# Patient Record
Sex: MALE | Race: BLACK OR AFRICAN AMERICAN | NOT HISPANIC OR LATINO | Employment: FULL TIME | URBAN - METROPOLITAN AREA
[De-identification: names, ages, dates, MRNs, and addresses within clinical notes are randomized per-mention and may not be internally consistent; named-entity substitution may affect disease eponyms.]

---

## 2020-02-05 ENCOUNTER — HOSPITAL ENCOUNTER (EMERGENCY)
Facility: HOSPITAL | Age: 30
Discharge: HOME | End: 2020-02-05
Attending: EMERGENCY MEDICINE
Payer: COMMERCIAL

## 2020-02-05 VITALS
OXYGEN SATURATION: 96 % | DIASTOLIC BLOOD PRESSURE: 69 MMHG | RESPIRATION RATE: 16 BRPM | HEART RATE: 75 BPM | HEIGHT: 72 IN | BODY MASS INDEX: 25.06 KG/M2 | SYSTOLIC BLOOD PRESSURE: 137 MMHG | WEIGHT: 185 LBS | TEMPERATURE: 99 F

## 2020-02-05 DIAGNOSIS — M54.41 ACUTE BILATERAL LOW BACK PAIN WITH RIGHT-SIDED SCIATICA: Primary | ICD-10-CM

## 2020-02-05 DIAGNOSIS — S39.012A STRAIN OF LUMBAR REGION, INITIAL ENCOUNTER: ICD-10-CM

## 2020-02-05 PROCEDURE — 63600175 PHARM REV CODE 636 W HCPCS: Performed by: NURSE PRACTITIONER

## 2020-02-05 PROCEDURE — 99284 EMERGENCY DEPT VISIT MOD MDM: CPT | Mod: 25

## 2020-02-05 PROCEDURE — 96372 THER/PROPH/DIAG INJ SC/IM: CPT | Mod: 59

## 2020-02-05 RX ORDER — NAPROXEN 500 MG/1
500 TABLET ORAL EVERY 12 HOURS PRN
Qty: 20 TABLET | Refills: 0 | Status: SHIPPED | OUTPATIENT
Start: 2020-02-05

## 2020-02-05 RX ORDER — TIZANIDINE 2 MG/1
2 TABLET ORAL EVERY 8 HOURS PRN
Qty: 15 TABLET | Refills: 0 | Status: SHIPPED | OUTPATIENT
Start: 2020-02-05

## 2020-02-05 RX ORDER — ORPHENADRINE CITRATE 30 MG/ML
60 INJECTION INTRAMUSCULAR; INTRAVENOUS
Status: COMPLETED | OUTPATIENT
Start: 2020-02-05 | End: 2020-02-05

## 2020-02-05 RX ORDER — KETOROLAC TROMETHAMINE 30 MG/ML
30 INJECTION, SOLUTION INTRAMUSCULAR; INTRAVENOUS
Status: COMPLETED | OUTPATIENT
Start: 2020-02-05 | End: 2020-02-05

## 2020-02-05 RX ADMIN — ORPHENADRINE CITRATE 60 MG: 30 INJECTION INTRAMUSCULAR; INTRAVENOUS at 04:02

## 2020-02-05 RX ADMIN — KETOROLAC TROMETHAMINE 30 MG: 30 INJECTION, SOLUTION INTRAMUSCULAR at 04:02

## 2020-02-05 NOTE — ED TRIAGE NOTES
Pt. Reports pain to his lower back that radiates to his right leg. ( pt reports pain stops around his posterior ankle ). Pt. Report he as been having pain for the past week and a half. Is able to ambulate without any assistance. Pt. Reports numbness to his right hip.

## 2020-02-05 NOTE — DISCHARGE INSTRUCTIONS
Take medications as needed only as prescribed.  Do not drive or drink alcohol when taking muscle relaxers because they will make you drowsy.    Follow-up with your regular doctor or the one listed on your discharge paperwork if symptoms have not improved in 1 week.    Return to the emergency department for any new or worsening symptoms.    Thank you for coming to our Emergency Department today. It is important to remember that some problems are difficult to diagnose and may not be found during your first visit. Be sure to follow up with your primary care doctor.  If you do not have one, you may contact the one listed on your discharge paperwork or you may also call the [facility] Appointment Desk at [phone] to schedule an appointment with one.     Return to the ER with any questions/concerns, new/concerning symptoms, worsening or failure to improve. Do not drive or make any important decisions for 24 hours if you have received any pain medications, sedatives or mood altering drugs during your ER visit.

## 2020-02-05 NOTE — ED PROVIDER NOTES
Encounter Date: 2/5/2020    SCRIBE #1 NOTE: I, Scotty Flood, am scribing for, and in the presence of,  Oleg Alvarez NP. I have scribed the following portions of the note - Other sections scribed: KALEY SOLIZ.       History     Chief Complaint   Patient presents with    Back Pain     lower back pain radiating down right leg x1.5 weeks.      Leo Harrison is a 29 y.o. male who presents to the ED for evaluation gradual onset, diffuse low back pain for 2 months with radiation down the RLE to the foot for 2 weeks. Patient describes the pain as aching that is exacerbated with lumbar flexion and with right hip flexion. He states taking ibuprofen at home with mild pain relief. He states already having 1600 mg ibuprofen today, but continues to have a fair amount of pain. Patient reports heavy lifting for work and also plays basketball regularly. Patient denies any recent trauma or fall.    The history is provided by the patient.     Review of patient's allergies indicates:   Allergen Reactions    Shellfish containing products Anaphylaxis     History reviewed. No pertinent past medical history.  History reviewed. No pertinent surgical history.  History reviewed. No pertinent family history.  Social History     Tobacco Use    Smoking status: Current Some Day Smoker     Packs/day: 0.50     Types: Cigarettes    Smokeless tobacco: Never Used   Substance Use Topics    Alcohol use: Yes     Frequency: Never    Drug use: Never     Review of Systems   Constitutional: Negative for fever.   HENT: Negative for sore throat.    Respiratory: Negative for shortness of breath.    Cardiovascular: Negative for chest pain.   Gastrointestinal: Negative for nausea.   Genitourinary: Negative for dysuria.   Musculoskeletal: Positive for back pain and myalgias (RLE pain).   Skin: Negative for rash.   Neurological: Negative for weakness.   Hematological: Does not bruise/bleed easily.       Physical Exam     Initial Vitals [02/05/20 1606]   BP  Pulse Resp Temp SpO2   137/69 75 16 98.5 °F (36.9 °C) 96 %      MAP       --         Physical Exam    Nursing note and vitals reviewed.  Constitutional: He appears well-developed and well-nourished. He is not diaphoretic. No distress.   HENT:   Head: Normocephalic and atraumatic.   Right Ear: External ear normal.   Left Ear: External ear normal.   Nose: Nose normal.   Mouth/Throat: Oropharynx is clear and moist.   Eyes: Conjunctivae and EOM are normal. Right eye exhibits no discharge. Left eye exhibits no discharge.   Neck: Normal range of motion. Neck supple. No tracheal deviation present.   Cardiovascular: Normal rate, regular rhythm and normal heart sounds.   Pulmonary/Chest: Breath sounds normal. No stridor. No respiratory distress.   Abdominal: Soft. He exhibits no distension. There is no tenderness.   Musculoskeletal: Normal range of motion.        Lumbar back: He exhibits tenderness and pain. He exhibits no bony tenderness, no swelling, no edema, no deformity and no laceration.   Mild paraspinal muscular tenderness to palpation. No midline tenderness. Ambulating with steady gait without difficulty.  Strength is equal and normal in bilateral lower extremities. No saddle anesthesia.   Neurological: He is alert and oriented to person, place, and time. He has normal strength. No cranial nerve deficit.   Skin: Skin is warm and dry.   Psychiatric: He has a normal mood and affect. His behavior is normal. Judgment and thought content normal.         ED Course   Procedures  Labs Reviewed - No data to display       Imaging Results    None          Medical Decision Making:   History:   Old Medical Records: I decided to obtain old medical records.  Differential Diagnosis:   Fracture, dislocation, strain, contusion, cauda equina syndrome, epidural abscess, paraspinal neoplasm, others  ED Management:  HPI and physical exam as above.    The is an emergent evaluation for a patient with low back pain. The patient has no  history of major trauma.  The patients age is not greater than 70 or less than 20 years old. I do not suspect an infectious, cancerous, or vascular etiology as the cause of the low back pain. The patient does not have a history of cancer or unexplained weight loss suggesting metastatic disease.  The patient does not have persistent fevers or night sweats. The patient is not immunocompromised and the patient has not had any chronic steroid use or intravenous drug use.  I do not think this patient has an epidural abscess, osteomyelitis, or metastatic spine lesions.  Pt does not have a history of  aortic aneurysm and I do not think there is evidence of retroperitoneal rupture.  The patient has a normal neurological exam and does not show evidence of cord or root compression.  The patient does not exhibit signs of urinary retention, urinary incontinence, bowel incontinence, or saddle anesthesia.  There is no evidence of cauda equine syndrome.     I do not think emergent radiography with X-rays or CT scan is warranted at this  time. Outpatient imaging can be obtained at the discretion of the primary care Physician.    I will offer this patient symptomatic treatment, reassurance, and education.    My physical exam findings were reviewed with the patient. Advised patient to follow up with his PCP for re-evaluation and further management.  ED return precautions given. All questions regarding diagnosis and plan were answered to the patient's fullest possible satisfaction. Patient expressed understanding of diagnosis, discharge instructions, and return precautions.            Patient note was created using easy2map voice dictation software.  Any errors in syntax or information may not have been identified and edited prior to signing this note.              Scribe Attestation:   Scribe #1: I performed the above scribed service and the documentation accurately describes the services I performed. I attest to the accuracy of the  note.                    Clinical Impression:       ICD-10-CM ICD-9-CM   1. Acute bilateral low back pain with right-sided sciatica M54.41 724.2     724.3     338.19   2. Strain of lumbar region, initial encounter S39.012A 847.2     Disposition:   Disposition: Discharged  Condition: Stable     I, Oleg Alvarez NP, personally performed the services described in this documentation. All medical record entries made by the scribe were at my direction and in my presence.  I have reviewed the chart and agree that the record reflects my personal performance and is accurate and complete.       Oleg Alvarez NP  02/05/20 1834

## 2022-02-23 ENCOUNTER — HOSPITAL ENCOUNTER (EMERGENCY)
Facility: HOSPITAL | Age: 32
Discharge: HOME | End: 2022-02-23
Attending: EMERGENCY MEDICINE
Payer: COMMERCIAL

## 2022-02-23 VITALS
TEMPERATURE: 99 F | OXYGEN SATURATION: 99 % | HEART RATE: 77 BPM | SYSTOLIC BLOOD PRESSURE: 133 MMHG | HEIGHT: 72 IN | DIASTOLIC BLOOD PRESSURE: 88 MMHG | BODY MASS INDEX: 25.06 KG/M2 | WEIGHT: 185 LBS | RESPIRATION RATE: 17 BRPM

## 2022-02-23 DIAGNOSIS — S01.81XA FACIAL LACERATION, INITIAL ENCOUNTER: Primary | ICD-10-CM

## 2022-02-23 PROCEDURE — 25000003 PHARM REV CODE 250: Performed by: PHYSICIAN ASSISTANT

## 2022-02-23 PROCEDURE — 12011 RPR F/E/E/N/L/M 2.5 CM/<: CPT | Mod: LT

## 2022-02-23 PROCEDURE — 99283 EMERGENCY DEPT VISIT LOW MDM: CPT | Mod: 25

## 2022-02-23 RX ORDER — ACETAMINOPHEN 500 MG
1000 TABLET ORAL
Status: COMPLETED | OUTPATIENT
Start: 2022-02-23 | End: 2022-02-23

## 2022-02-23 RX ORDER — LIDOCAINE HYDROCHLORIDE AND EPINEPHRINE 10; 10 MG/ML; UG/ML
10 INJECTION, SOLUTION INFILTRATION; PERINEURAL
Status: COMPLETED | OUTPATIENT
Start: 2022-02-23 | End: 2022-02-23

## 2022-02-23 RX ADMIN — ACETAMINOPHEN 1000 MG: 500 TABLET ORAL at 11:02

## 2022-02-23 RX ADMIN — BACITRACIN, NEOMYCIN, POLYMYXIN B 1 EACH: 400; 3.5; 5 OINTMENT TOPICAL at 11:02

## 2022-02-23 RX ADMIN — LIDOCAINE HYDROCHLORIDE AND EPINEPHRINE 10 ML: 10; 10 INJECTION, SOLUTION INFILTRATION; PERINEURAL at 11:02

## 2022-02-24 NOTE — DISCHARGE INSTRUCTIONS
Keep wound clean and dry.  Clean the wound gently with soap and water twice daily.  Apply antibiotic ointment twice daily.  Continue with Tylenol as needed for headache.    Sutures need to be removed in 5-6 days.  Return to this ED for suture removal and wound re-evaluation.    Return to this ED in the meantime if you develop severe headache despite treatment, if you begin with frequent vomiting, if you begin to feel unsteady or off-balance with walking, if wound becomes red and warm, if wound begins to drain foul-smelling fluid, if you begin with fever, if any other problems occur.

## 2022-02-24 NOTE — ED PROVIDER NOTES
Encounter Date: 2/23/2022       History     Chief Complaint   Patient presents with    Laceration     Right eyebrow laceration sustained today while playing basketball, states last tetanus one year ago     31-year-old male smoker, history of right-sided sciatica, presents to ED complaining of right eyebrow laceration sustained approximately 7:30 p.m. today.    Patient was playing basketball, he and another player both going up for a rebound, struck heads.  No LOC.  Admits to mild right-sided temporal headache.  No nausea vomiting.  No visual disturbance.  No lightheadedness dizziness.  Denies any neck or new back pain.  No trouble with ambulation.  No unsteady gait.  No uncontrolled bleeding from the wound to the right eyebrow.  No painful EOMs.  Tetanus up-to-date.        Review of patient's allergies indicates:   Allergen Reactions    Shellfish containing products Anaphylaxis     No past medical history on file.  No past surgical history on file.  No family history on file.  Social History     Tobacco Use    Smoking status: Current Some Day Smoker     Packs/day: 0.50     Types: Cigarettes    Smokeless tobacco: Never Used   Substance Use Topics    Alcohol use: Yes    Drug use: Never     Review of Systems   HENT: Positive for facial swelling.    Eyes: Negative for visual disturbance.   Skin: Positive for wound.   Neurological: Positive for headaches. Negative for syncope, weakness and light-headedness.       Physical Exam     Initial Vitals [02/23/22 2209]   BP Pulse Resp Temp SpO2   133/88 77 17 98.6 °F (37 °C) 99 %      MAP       --         Physical Exam    Nursing note and vitals reviewed.  Constitutional: He appears well-developed and well-nourished. He is not diaphoretic. No distress.   Well-appearing, nontoxic.  Sitting upright on exam table.   HENT:   Head: Normocephalic.   No Badillo's sign, no raccoon eyes.  No bony scalp deformity or significant tenderness.    OD lateral eyebrow with 2cm horizontal  laceration; no underlying bony deformity or significant orbital tenderness.   Eyes: EOM are normal. Pupils are equal, round, and reactive to light.   Full, symmetric EOMs without discomfort or difficulty.  No proptosis.   Neck: Neck supple.   No midline cervical spine tenderness   Normal range of motion.  Pulmonary/Chest: No respiratory distress.   Musculoskeletal:      Cervical back: Normal range of motion and neck supple.     Neurological: He is alert and oriented to person, place, and time. GCS score is 15. GCS eye subscore is 4. GCS verbal subscore is 5. GCS motor subscore is 6.   Skin: Skin is warm. Capillary refill takes less than 2 seconds.   Psychiatric: He has a normal mood and affect. Thought content normal.         ED Course   Lac Repair    Date/Time: 2/24/2022 5:52 AM  Performed by: Jhon Gallegos PA-C  Authorized by: Santosh Chan MD     Consent:     Consent obtained:  Verbal    Consent given by:  Patient    Risks discussed:  Need for additional repair, poor cosmetic result and poor wound healing  Laceration details:     Location:  Face    Face location:  L eyebrow    Length (cm):  2  Pre-procedure details:     Preparation:  Patient was prepped and draped in usual sterile fashion  Exploration:     Hemostasis achieved with:  Direct pressure    Wound exploration: wound explored through full range of motion and entire depth of wound visualized      Wound extent: no foreign bodies/material noted, no underlying fracture noted and no vascular damage noted      Contaminated: no    Treatment:     Area cleansed with:  Saline    Amount of cleaning:  Standard    Irrigation solution:  Sterile saline    Irrigation volume:  30mL    Irrigation method:  Syringe  Skin repair:     Repair method:  Sutures    Suture size:  6-0    Suture material:  Prolene    Suture technique:  Simple interrupted    Number of sutures:  6  Approximation:     Approximation:  Loose  Post-procedure details:     Dressing:  Antibiotic  ointment    Procedure completion:  Tolerated well, no immediate complications      Labs Reviewed - No data to display       Imaging Results    None          Medications   neomycin-bacitracnZn-polymyxnB packet (1 each Topical (Top) Given 2/23/22 2321)   LIDOcaine-EPINEPHrine 1%-1:100,000 injection 10 mL (10 mLs Intradermal Given 2/23/22 2322)   acetaminophen tablet 1,000 mg (1,000 mg Oral Given 2/23/22 2322)     Medical Decision Making:   Differential Diagnosis:   Facial fracture, open fracture, laceration  ED Management:  Tolerated well without any complications.  Return precautions discussed.                      Clinical Impression:   Final diagnoses:  [S01.81XA] Facial laceration, initial encounter (Primary)          ED Disposition Condition    Discharge Stable        ED Prescriptions     None        Follow-up Information     Follow up With Specialties Details Why Contact [facility] Emergency Dept Emergency Medicine Go in 6 days For wound re-check, For reevaluation, For suture removal [id number]  [facility] [id number] [phone]           Jhon Gallegos PA-C  02/24/22 5858